# Patient Record
Sex: FEMALE
[De-identification: names, ages, dates, MRNs, and addresses within clinical notes are randomized per-mention and may not be internally consistent; named-entity substitution may affect disease eponyms.]

---

## 2018-09-21 ENCOUNTER — HOSPITAL ENCOUNTER (OUTPATIENT)
Dept: HOSPITAL 92 - L&D/OP | Age: 28
Discharge: HOME | End: 2018-09-21
Attending: OBSTETRICS & GYNECOLOGY
Payer: SELF-PAY

## 2018-09-21 VITALS — BODY MASS INDEX: 33.8 KG/M2

## 2018-09-21 DIAGNOSIS — Z79.84: ICD-10-CM

## 2018-09-21 DIAGNOSIS — Z3A.34: ICD-10-CM

## 2018-09-21 DIAGNOSIS — O24.415: ICD-10-CM

## 2018-09-21 DIAGNOSIS — Z79.899: ICD-10-CM

## 2018-09-21 DIAGNOSIS — O47.03: Primary | ICD-10-CM

## 2018-09-21 DIAGNOSIS — Z79.82: ICD-10-CM

## 2018-09-21 LAB
CRYSTAL-AUWI FLAG: 0 (ref 0–15)
HEV IGM SER QL: 2.3 (ref 0–7.99)
HYALINE CASTS #/AREA URNS LPF: (no result) LPF
PATHC CAST-AUWI FLAG: 0.43 (ref 0–2.49)
RBC UR QL AUTO: (no result) HPF (ref 0–3)
SP GR UR STRIP: 1 (ref 1–1.04)
SPERM-AUWI FLAG: 0 (ref 0–9.9)
WBC UR QL AUTO: (no result) HPF (ref 0–3)
YEAST-AUWI FLAG: 0 (ref 0–25)

## 2018-09-21 PROCEDURE — 87480 CANDIDA DNA DIR PROBE: CPT

## 2018-09-21 PROCEDURE — 87660 TRICHOMONAS VAGIN DIR PROBE: CPT

## 2018-09-21 PROCEDURE — 87491 CHLMYD TRACH DNA AMP PROBE: CPT

## 2018-09-21 PROCEDURE — 87591 N.GONORRHOEAE DNA AMP PROB: CPT

## 2018-09-21 PROCEDURE — 99285 EMERGENCY DEPT VISIT HI MDM: CPT

## 2018-09-21 PROCEDURE — 81003 URINALYSIS AUTO W/O SCOPE: CPT

## 2018-09-21 PROCEDURE — 87510 GARDNER VAG DNA DIR PROBE: CPT

## 2018-09-21 PROCEDURE — 81015 MICROSCOPIC EXAM OF URINE: CPT

## 2018-09-21 NOTE — PDOC.EVN
Event Note





- Event Note


Event Note: 


S: 27yo G1 at 34 EGA confirmed with 12 week US presented for contractions. Full 

H&P in physical chart. Contractions remain to be non-painful. Pt feels well 

with no complaints at this time.





O- /64, , RR 16, T 98.6


FHT: baseline 140's, mod variability, no deccels/accels. Cat 1.


Contractions: q4-5min


SVE: fingertip on second check





A/P:


- pt not in labor, UA and VP3 are pending


- will discharge and call pt with results


- follow up at [facility]








<Sonny Herring - Last Filed: 09/21/18 20:00>





Attending Addendum





- Attending Addendum


Date/Time: 09/21/18 2327





I personally evaluated the patient and discussed the management with Dr. Herring.


I agree with the History, Examination, Assessment and Plan documented above.  

Patient not feeling ctx and exam unchanged over 2 hours.  D/c home with 

precautions.








<Noemí Roberto - Last Filed: 09/21/18 22:53>

## 2018-10-27 ENCOUNTER — HOSPITAL ENCOUNTER (INPATIENT)
Dept: HOSPITAL 92 - L&D/OP | Age: 28
LOS: 3 days | Discharge: HOME | End: 2018-10-30
Attending: OBSTETRICS & GYNECOLOGY | Admitting: OBSTETRICS & GYNECOLOGY
Payer: MEDICAID

## 2018-10-27 VITALS — BODY MASS INDEX: 32.9 KG/M2

## 2018-10-27 DIAGNOSIS — Z3A.39: ICD-10-CM

## 2018-10-27 DIAGNOSIS — A63.0: ICD-10-CM

## 2018-10-27 DIAGNOSIS — D50.9: ICD-10-CM

## 2018-10-27 LAB
HBSAG INDEX: 0.21 S/CO (ref 0–0.99)
HGB BLD-MCNC: 12 G/DL (ref 12–16)
MCH RBC QN AUTO: 28.4 PG (ref 27–31)
MCV RBC AUTO: 84.2 FL (ref 78–98)
PLATELET # BLD AUTO: 298 THOU/UL (ref 130–400)
RBC # BLD AUTO: 4.23 MILL/UL (ref 4.2–5.4)
SYPHILIS ANTIBODY INDEX: 0.05 S/CO
WBC # BLD AUTO: 11.1 THOU/UL (ref 4.8–10.8)

## 2018-10-27 PROCEDURE — 86901 BLOOD TYPING SEROLOGIC RH(D): CPT

## 2018-10-27 PROCEDURE — 85049 AUTOMATED PLATELET COUNT: CPT

## 2018-10-27 PROCEDURE — 82805 BLOOD GASES W/O2 SATURATION: CPT

## 2018-10-27 PROCEDURE — 36415 COLL VENOUS BLD VENIPUNCTURE: CPT

## 2018-10-27 PROCEDURE — 85018 HEMOGLOBIN: CPT

## 2018-10-27 PROCEDURE — 99285 EMERGENCY DEPT VISIT HI MDM: CPT

## 2018-10-27 PROCEDURE — 85014 HEMATOCRIT: CPT

## 2018-10-27 PROCEDURE — 86850 RBC ANTIBODY SCREEN: CPT

## 2018-10-27 PROCEDURE — 85027 COMPLETE CBC AUTOMATED: CPT

## 2018-10-27 PROCEDURE — 87340 HEPATITIS B SURFACE AG IA: CPT

## 2018-10-27 PROCEDURE — 86780 TREPONEMA PALLIDUM: CPT

## 2018-10-27 PROCEDURE — 86900 BLOOD TYPING SEROLOGIC ABO: CPT

## 2018-10-27 PROCEDURE — 36416 COLLJ CAPILLARY BLOOD SPEC: CPT

## 2018-10-27 PROCEDURE — 51702 INSERT TEMP BLADDER CATH: CPT

## 2018-10-27 RX ADMIN — Medication SCH ML: at 14:50

## 2018-10-27 RX ADMIN — Medication SCH ML: at 20:46

## 2018-10-27 NOTE — PDOC.LDPN
Labor & Delivery Progress Note





- Subjective


Subjective: comfortable





- Objective


Vital signs reviewed and normal: yes


General: NAD, resting


Dilation: 9


Effacement: 100%


Station: 1+


FHT: category 1 (baseline 150)


[place] contractions every: 2-3


Resuscitative measures: maternal IV fluids


Plan: continue plan of care


-: 


29 yo  at 39.1wks by LMP





sIUP 


- SVE:progressing since last check, /+1, VS stable.


- IUPC in place with adequate contractions, transverse presentation. 


- U/S on 10/12 shows Hadlock of 83%


- Epidural in place


- Continue serial checks





GDMA2


- Accucheck q2h


- SSI


- Hypoglycemic protocol


- Last glucose 117





Iron Def Anemia


- Hg 12





Hx of Chlamydia in pregnancy


- Negative DARLING





High risk HPV


- Colpo on  showed 3 suspicious lesions 


- Continue outpatient workup 6 wk pp

## 2018-10-27 NOTE — PDOC.LDHP
Labor and Delivery H&P


HPI: 





29 yo G1 at 39 at 39.1 here for back pain. Started yesterday after clinic when 

she was seen for her prenatal care and had a cervical check. She rates it 7/10 

and it does not radiate. It is localized to b/l lower back and lower abdomen. 

Describes it as crampy. Has not taken any medications. She endorses minimal 

vaginal bleeding enough to soak through a pad after being checked in clinic 

yesterday. Endorses FM and denies LOF/VD. 


Current gestational age (weeks): 39 (39.1)


Due date: 18


Grav: 1


Para: 0


Current pregnancy complications: gestational diabetes


Abnormal US findings: No


Current medications: pre-nasreen vitamins, other (metformin 500mg BID)


Social history: none





- Physical Exam


General: breathing through contractions, other (discomfort from p ain)


Heart: RRR


Lungs: CTAB


Abdomen: gravid


FHT: category 1


[place] contractions every: 2-3 min





- Vaginal Exam


cm dilated: 4





- OB Labs


Blood type: O


RH: positive


Antibody Screen: negative


HIV: negative


RPR: negative


HEPSAg: negative


GBS: negative





- Assessment


L&D Assessment: term patient in labor





- Plan


-: 











29 yo G1 at 39.1 here with latent labor





1. sIUP, latent labor


-SVE: /-1


-FHT: Cat I, cameron q2-3min


-U/S on 10/12 shows Hadlock of 83%


-stadol due to pain from CTXs


-will admit for expectant management of labor








2. GDMA2


-accucheck 








3. High risk HPV


-continue outpatient workup





<Ivy Valentin - Last Filed: 10/27/18 07:56>





<Mina Anthony - Last Filed: 10/30/18 09:06>


Allergies/Adverse Reactions: 


 Allergies











Allergy/AdvReac Type Severity Reaction Status Date / Time


 


No Known Allergies Allergy   Verified 18 16:42














Attending Addendum





- Attending Addendum


Date/Time: 10/30/18 0906





I personally evaluated the patient and discussed the management with Dr. Valentin.


I agree with the History, Examination, Assessment and Plan documented above.








<Mina Anthony - Last Filed: 10/30/18 09:06>

## 2018-10-27 NOTE — PDOC.LDPN
Labor & Delivery Progress Note





- Subjective


Subjective: comfortable





- Objective


Vital signs reviewed and normal: yes


General: NAD, resting


Uterine fundus: non tender


Dilation: 7


Effacement: 100%


Station: 0


FHT: category 2 (130/mod/+ accels/occasional early and variable decels)


[place] contractions every: 2-3min


AROM: meconium stained fluid


Plan: continue plan of care


-: 


27 yo  at 39.1wks by LMP





sIUP 


- SVE: 7/100/0, checked by nurse who reported continued bloody fluid. Pt with 

no abdominal pain, VS stable. Will continue to monitor


- FHT: Cat II for occasional and variable decels.  cameron q2-3min


- U/S on 10/12 shows Hadlock of 83%


- Epidural in place








GDMA2


- Accucheck q2h


- SSI


- Hypoglycemic protocol


- Last glucose 115








Iron Def Anemia


- Hg 12








Hx of Chlamydia in pregnancy


- Negative DARLING








High risk HPV


- Colpo on  showed 3 suspicious lesions 


- Continue outpatient workup 6 wk pp








<Nory Rosario - Last Filed: 10/27/18 13:20>





Attending Addendum





- Attending Addendum


Date/Time: 18 6940





I personally evaluated the patient and discussed the management with Dr. Orellana


I agree with the History, Examination, Assessment and Plan documented above 

with any addition or exceptions noted below.








<Valentín Layton - Last Filed: 18 07:41>

## 2018-10-27 NOTE — PDOC.FM
- Objective


MAR Reviewed: Yes


Vital Signs & Weight: 


 Vital Signs (12 hours)











  Temp Pulse Resp BP


 


 10/27/18 06:01  97.5 F L  81  16  153/95 H








 Weight











Weight                         81.647 kg














Result Diagrams: 


 10/27/18 08:26








Dx/Plan


(1) Term pregnancy


Code(s): Z34.80 - ENCOUNTER FOR SUPRVSN OF NORMAL PREGNANCY, UNSP TRIMESTER   

Status: Acute   





- Plan


Plan: 


27 yo  at 39.1wks by LMP





sIUP 


- SVE: 8.5/100/0, Pt with intermittent abdominal pain mostly relieved by 

epidural, VS stable.


- FHT: Cat II for occasional and variable decels.  cameron q2-3min


- U/S on 10/12 shows Hadlock of 83%


- Epidural in place


- Continue serial checks








GDMA2


- Accucheck q2h


- SSI


- Hypoglycemic protocol


- Last glucose 93








Iron Def Anemia


- Hg 12








Hx of Chlamydia in pregnancy


- Negative DARLING








High risk HPV


- Colpo on  showed 3 suspicious lesions 


- Continue outpatient workup 6 wk pp

## 2018-10-27 NOTE — PDOC.LDPN
Labor & Delivery Progress Note





- Subjective


Subjective: comfortable





- Objective


Vital signs reviewed and normal: yes


General: NAD


Uterine fundus: non tender


Dilation: 8


Effacement: 100%


Station: 0


FHT: category 2 (140/mod/+accels/early and variable decels)


[place] contractions every: 2-3min





- Assessment


(1) Term pregnancy


Code(s): Z34.80 - ENCOUNTER FOR SUPRVSN OF NORMAL PREGNANCY, UNSP TRIMESTER   

Status: Acute   


Plan: continue plan of care


-: 


29 yo  at 39.1wks by LMP





sIUP 


- SVE: 8/100/0, checked by nurse who reported continued bloody fluid. Pt with 

brief abdominal pain that was relieved with epidural, VS stable. Will continue 

to monitor


- FHT: Cat II for occasional and variable decels.  cameron q2-3min


- U/S on 10/12 shows Hadlock of 83%


- Epidural in place


- Continue serial checks








GDMA2


- Accucheck q2h


- SSI


- Hypoglycemic protocol


- Last glucose 93








Iron Def Anemia


- Hg 12








Hx of Chlamydia in pregnancy


- Negative DARLING








High risk HPV


- Colpo on  showed 3 suspicious lesions 


- Continue outpatient workup 6 wk pp








<Nory Rosario - Last Filed: 10/27/18 15:14>





- Objective


Resuscitative measures: other





<Valentín Layton - Last Filed: 18 07:42>





Attending Addendum





- Attending Addendum


Date/Time: 1842





I personally evaluated the patient and discussed the management with Dr. Rosario


I agree with the History, Examination, Assessment and Plan documented above 

with any addition or exceptions noted below.








<Valentín Layton - Last Filed: 18 07:42>

## 2018-10-27 NOTE — PDOC.LDPN
Labor & Delivery Progress Note





- Subjective


Subjective: comfortable





- Objective


Vital signs reviewed and normal: yes


General: NAD


Uterine fundus: non tender


Dilation: 8.5


Effacement: 100%


Station: 0


FHT: category 2 (150/moderate/+ accels/occasional early decels)


[place] contractions every: 2-3min


AROM: meconium stained fluid





- Assessment


(1) Term pregnancy


Code(s): Z34.80 - ENCOUNTER FOR SUPRVSN OF NORMAL PREGNANCY, UNSP TRIMESTER   

Current Visit: Yes   Status: Acute   


-: 


29 yo  at 39.1wks by LMP





sIUP 


- SVE: unchanged, 8.5/100/0, abdominal pain resolved, VS stable.


- IUPC in place with adequate contractions, transverse presentation. 


- Will start Pitocin


- FHT: Cat II for occasional early decels.  cameron q2-3min


- U/S on 10/12 shows Hadlock of 83%


- Epidural in place


- Continue serial checks








GDMA2


- Accucheck q2h


- SSI


- Hypoglycemic protocol


- Last glucose 108








Iron Def Anemia


- Hg 12








Hx of Chlamydia in pregnancy


- Negative DARLING








High risk HPV


- Colpo on  showed 3 suspicious lesions 


- Continue outpatient workup 6 wk pp

## 2018-10-27 NOTE — PDOC.LDPN
Labor & Delivery Progress Note





- Subjective


Subjective: painful contractions





- Objective


Vital signs reviewed and normal: yes


General: NAD


Dilation: 8.5


Effacement: 100%


Station: 0


FHT: category 2 (140/mod/+ accels/early and variable decels)


[place] contractions every: 2-3min


AROM: meconium stained fluid





- Assessment


(1) Term pregnancy


Code(s): Z34.80 - ENCOUNTER FOR SUPRVSN OF NORMAL PREGNANCY, UNSP TRIMESTER   

Current Visit: Yes   Status: Acute   


-: 


27 yo  at 39.1wks by LMP





sIUP 


- SVE: 8.5/100/0, Pt with intermittent abdominal pain mostly relieved by 

epidural, VS stable.


- Will insert IUPC and consider starting pit


- FHT: Cat II for occasional and variable decels.  cameron q2-3min


- U/S on 10/12 shows Hadlock of 83%


- Epidural in place


- Continue serial checks








GDMA2


- Accucheck q2h


- SSI


- Hypoglycemic protocol


- Last glucose 93








Iron Def Anemia


- Hg 12








Hx of Chlamydia in pregnancy


- Negative DARLING








High risk HPV


- Colpo on  showed 3 suspicious lesions 


- Continue outpatient workup 6 wk pp

## 2018-10-27 NOTE — PDOC.LDPN
Labor & Delivery Progress Note





- Subjective


Subjective: comfortable





- Objective


Vital signs reviewed and normal: yes


General: NAD


Uterine fundus: non tender


Dilation: 8


Effacement: 100%


Station: 0


FHT: category 2 (150/mod/no accels/1 variable decel)


[place] contractions every: 2-3min


AROM: meconium stained fluid


IUPC placed: yes





- Assessment


(1) Term pregnancy


Code(s): Z34.80 - ENCOUNTER FOR SUPRVSN OF NORMAL PREGNANCY, UNSP TRIMESTER   

Current Visit: Yes   Status: Acute   


-: 


27 yo  at 39.1wks by LMP





sIUP 


- SVE: unchanged, 8100/0, VS stable.


- IUPC in place with adequate contractions, transverse presentation. 


- Pitocin has not yet been started, contractions no longer adequate


- FHT: Cat II for rare variable decel.  cameron q2-3min


- U/S on 10/12 shows Hadlock of 83%


- Epidural in place


- Continue serial checks








GDMA2


- Accucheck q2h


- SSI


- Hypoglycemic protocol


- Last glucose 108








Iron Def Anemia


- Hg 12








Hx of Chlamydia in pregnancy


- Negative DARLING








High risk HPV


- Colpo on  showed 3 suspicious lesions 


- Continue outpatient workup 6 wk pp

## 2018-10-28 LAB
ANALYZER IN CARDIO: (no result)
BASE EXCESS STD BLDA CALC-SCNC: -6.9 MEQ/L
BASE EXCESS STD BLDV CALC-SCNC: -4.6 MEQ/L
HCO3 BLDA-SCNC: 22.2 MEQ/L (ref 22–28)
HCO3 BLDV-SCNC: 21 MEQ/L (ref 22–28)
HGB BLD-MCNC: 10.2 G/DL (ref 12–16)
PH BLDV: 7.33 [PH] (ref 7.32–7.43)
PLATELET # BLD AUTO: 237 THOU/UL (ref 130–400)

## 2018-10-28 PROCEDURE — 4A1HXFZ MONITORING OF PRODUCTS OF CONCEPTION, CARDIAC RHYTHM, EXTERNAL APPROACH: ICD-10-PCS | Performed by: OBSTETRICS & GYNECOLOGY

## 2018-10-28 PROCEDURE — 4A1HXCZ MONITORING OF PRODUCTS OF CONCEPTION, CARDIAC RATE, EXTERNAL APPROACH: ICD-10-PCS | Performed by: OBSTETRICS & GYNECOLOGY

## 2018-10-28 RX ADMIN — Medication SCH MLS: at 02:28

## 2018-10-28 RX ADMIN — Medication SCH MLS: at 00:45

## 2018-10-28 RX ADMIN — DOCUSATE CALCIUM SCH MG: 240 CAPSULE, LIQUID FILLED ORAL at 22:51

## 2018-10-28 RX ADMIN — DOCUSATE CALCIUM SCH MG: 240 CAPSULE, LIQUID FILLED ORAL at 09:45

## 2018-10-28 NOTE — DN-2
DATE OF DELIVERY:  10/28/2018

 

DELIVERING PHYSICIANS:

1.  Sonny Herring M.D., PGY-1

2.  Pratik Clinton M.D., PGY-2

3.  _____, PGY-3.

 

ATTENDING:  Valentín Layton M.D.

 

PROCEDURE:  Spontaneous vaginal delivery.

 

ANESTHESIA:  Epidural.

 

ESTIMATED BLOOD LOSS:  400 mL.

 

PREOPERATIVE DIAGNOSES:

1.  Term intrauterine pregnancy in labor.

2.  Gestational diabetes A2.

3.  High risk human papillomavirus.

 

POSTOPERATIVE DIAGNOSES:

1.  Term intrauterine pregnancy, delivered.

2.  Gestational diabetes A2.

3.  High risk human papillomavirus.

 

INDICATIONS:  A 28-year-old female, G1, P0-0-0-0 at 39 and 1 weeks presented in 
active labor.

 

DELIVERY NOTE:  This is a 28-year-old female, G1, P0-0-0-0 at 39 and 2 weeks 
who delivered a viable female infant at 0034 hours.  Following an uneventful 
antepartum course, a vigorous female was delivered over an intact perineum in 
the occiput anterior position, anterior shoulder and then remainder of the body 
delivered.  Nuchal cord x1, body cord x1.  The head was held down and mouth and 
nares were bulb suctioned.  Cord clamped and cut and cord blood collected.  
Placenta delivered intact with a 3-vessel cord noted.  Fundal massage was 
performed and the fundus was firm.  Cervix and vagina were inspected and a few 
first degree lacerations were noted on the perineal wall, but did not require 
repair at this time.  Infant went to the  nursery in good condition for 
routine care.  Apgars were 8 and 9 at 1 and 5 minutes respectively.  The 
patient tolerated delivery well and went to postpartum after routine recovery 
care.



I was present and supervising the entire second and third stages of delivery.

 

MARY

## 2018-10-28 NOTE — PDOC.PP
Post Partum Progress Note


Post Partum Day #: 0


Subjective: 





Feeling well this morning. Having some cramping but it is improved with Motrin. 

She is feeling a little woozy when standing. Reports lochia is approx the 

amount of a menstrual cycle. Continuing to attempt breastfeeding but is 

concerned baby is not latching well.


PO intake tolerated: yes


Flatus: yes


Ambulation: yes


 Vital Signs (12 hours)











  Temp Pulse Resp BP


 


 10/28/18 06:00  98.5 F  86  18  134/65


 


 10/28/18 03:50  98.4 F  88  18  132/76








 Weight











Weight                         81.647 kg

















- Physical Examination


General: NAD


Cardiovascular: no m/r/g, RRR


Respiratory: clear to auscultation bilaterally


Abdominal: lochia (scant), appropriately TTP


Fundus firm & at: umbilicus


Neurological: no gross focal deficits


Psychiatric: A&Ox3, normal affect


Result Diagrams: 


 10/27/18 08:26





Additional Labs: 


 Post Partum Labs











Blood Type  O POSITIVE   10/27/18  08:26    


 


Hep Bs Antigen  Non-Reactive S/CO (NonReactive)   10/27/18  08:26    











(1) Gestational diabetes


Code(s): O24.419 - GESTATIONAL DIABETES MELLITUS IN PREGNANCY, UNSP CONTROL   

Status: Acute   





- Assessment/Plan





27 yo G1 now  who delivered earlier this morning via , PPD#0





1. PPD #0


- Ambulating and pain adequately controlled


- Baby on bili lights this morning for Poli positive


- Encouraged ambulation


- Lightheaded, will check H&H this morning


- Continue PNV


- Motrin and Tylenol #3 PRN pain





2. A2GDM


- Needs 6 week GTT

## 2018-10-29 VITALS — TEMPERATURE: 97.6 F

## 2018-10-29 RX ADMIN — DOCUSATE CALCIUM SCH MG: 240 CAPSULE, LIQUID FILLED ORAL at 09:46

## 2018-10-29 RX ADMIN — DOCUSATE CALCIUM SCH MG: 240 CAPSULE, LIQUID FILLED ORAL at 20:50

## 2018-10-29 NOTE — PDOC.PP
Post Partum Progress Note


Post Partum Day #: 1


Subjective: 





Pt reports doing well. Reports being sore in back. Has been getting up and 

moving around. Tolerating diet well. Pt concerned with breastfeeding at this 

time. Says infant not latching well. Reports lochia about same as period. Pt 

denies any lightheadness or dizziness.


PO intake tolerated: yes


Flatus: yes


Ambulation: yes


 Vital Signs (12 hours)











  Temp Pulse Resp BP


 


 10/28/18 20:00  98.3 F  75  18  100/60








 Weight











Weight                         81.647 kg

















- Physical Examination


General: NAD


Cardiovascular: no m/r/g, RRR


Respiratory: clear to auscultation bilaterally, non-labored breathing


Abdominal: + bowel sounds, lochia (Same as period), no distention, 

appropriately TTP


Extremities: negative homans (B)


Neurological: no gross focal deficits


Psychiatric: A&Ox3, normal affect


Result Diagrams: 


 10/28/18 08:16





Additional Labs: 


 Post Partum Labs











Blood Type  O POSITIVE   10/27/18  08:26    


 


Hep Bs Antigen  Non-Reactive S/CO (NonReactive)   10/27/18  08:26    











(1) Gestational diabetes


Code(s): O24.419 - GESTATIONAL DIABETES MELLITUS IN PREGNANCY, UNS CONTROL   

Status: Acute   


Qualifiers: 


   Gestational diabetes mellitus control: oral hypoglycemic-controlled 





(2) Term pregnancy


Code(s): Z34.80 - ENCOUNTER FOR SUPRVSN OF NORMAL PREGNANCY, [facility] TRIMESTER   

Status: Acute   





- Assessment/Plan





27 yo G1 now  who delivered on 10/28/18 via  at 0:34, PPD#1





1. PPD #1


- Reports having some back pain. Reports pain being controlled. Discussed with 

her likely due to length of labor and some muscle soreness due to pushing. 

Advised to continue to ask for pain medicine as needed


- Baby on bili lights this morning for Poli positive. Likely will be here til 

tmrw


- Encouraged ambulation


- Continue PNV


- Motrin and tylenol prn as needed. 





2. A2GDM


- Needs 6 week GTT.


-BG has been elevated. Last check normal. Continue to monitor today. 





<Pratik Clinton - Last Filed: 10/29/18 07:53>


 Weight











Weight                         180 lb














Result Diagrams: 


 10/28/18 08:16





Additional Labs: 


 Post Partum Labs











Blood Type  O POSITIVE   10/27/18  08:26    


 


Hep Bs Antigen  Non-Reactive S/CO (NonReactive)   10/27/18  08:26    














<Valentín Layton - Last Filed: 18 07:50>





Attending Addendum





- Attending Addendum


Date/Time: 18 0750





I personally evaluated the patient and discussed the management with Dr. Clinton


I agree with the History, Examination, Assessment and Plan documented above 

with any addition or exceptions noted below.








<Valentín Layton - Last Filed: 18 07:50>

## 2018-10-30 VITALS — SYSTOLIC BLOOD PRESSURE: 113 MMHG | DIASTOLIC BLOOD PRESSURE: 71 MMHG

## 2018-10-30 RX ADMIN — DOCUSATE CALCIUM SCH MG: 240 CAPSULE, LIQUID FILLED ORAL at 10:01

## 2018-10-30 NOTE — PDOC.PP
Post Partum Progress Note


Post Partum Day #: 2


Subjective: 





Pt doing well. Reports some occasional leg pain but reports being well 

controlled with tylenol and motrin. Pt up and moving. No lightheadness or 

dizziness. Tolerating PO. No other concerns at this time. 


PO intake tolerated: yes


Flatus: yes


Ambulation: yes


 Weight











Weight                         81.647 kg

















- Physical Examination


General: NAD


Cardiovascular: no m/r/g, RRR


Respiratory: clear to auscultation bilaterally, non-labored breathing


Abdominal: + bowel sounds, lochia (Lochia same as period. No concerns), no 

distention, appropriately TTP


Fundus firm & at: below umbilicus


Neurological: no gross focal deficits


Psychiatric: A&Ox3, normal affect


Result Diagrams: 


 10/28/18 08:16





Additional Labs: 


 Post Partum Labs











Blood Type  O POSITIVE   10/27/18  08:26    


 


Hep Bs Antigen  Non-Reactive S/CO (NonReactive)   10/27/18  08:26    











(1) Gestational diabetes


Code(s): O24.419 - GESTATIONAL DIABETES MELLITUS IN PREGNANCY, UNSP CONTROL   

Status: Acute   


Qualifiers: 


   Gestational diabetes mellitus control: oral hypoglycemic-controlled 





(2) Term pregnancy


Code(s): Z34.80 - ENCOUNTER FOR SUPRVSN OF NORMAL PREGNANCY, UNSP TRIMESTER   

Status: Acute   





- Assessment/Plan





27 yo G1 now  who delivered on 10/28/18 via  at 0:34, PPD#2





1. PPD #2


-  Reports pain being controlled. Discussed with her likely due to length of 

labor and some muscle soreness due to pushing. Advised to continue to ask for 

pain medicine as needed


- Baby on bili lights this morning for Poli positive. Awaiting bili check. 

Pending possibly could be discharge today. 


- Encouraged ambulation


- Continue PNV


- Motrin and tylenol prn as needed. 





2. A2GDM


- Needs 6 week GTT.








<Pratik Clinton - Last Filed: 10/30/18 07:57>


 Vital Signs (12 hours)











  Temp Pulse Resp BP Pulse Ox


 


 10/30/18 08:13  97.6 F  64  20  113/71  98








 Weight











Weight                         81.647 kg














Result Diagrams: 


 10/28/18 08:16





Additional Labs: 


 Post Partum Labs











Blood Type  O POSITIVE   10/27/18  08:26    


 


Hep Bs Antigen  Non-Reactive S/CO (NonReactive)   10/27/18  08:26    














<Mina Anthony - Last Filed: 10/30/18 09:12>





Attending Addendum





- Attending Addendum


Date/Time: 10/30/18 0912





I evaluated the patient and discussed the management with Dr. Clinton.


I agree with the Assessment and Plan documented above.








<Mina Anthony - Last Filed: 10/30/18 09:12>

## 2019-03-22 ENCOUNTER — HOSPITAL ENCOUNTER (EMERGENCY)
Dept: HOSPITAL 92 - ERS | Age: 29
LOS: 1 days | Discharge: HOME | End: 2019-03-23
Payer: MEDICAID

## 2019-03-22 DIAGNOSIS — R79.89: ICD-10-CM

## 2019-03-22 DIAGNOSIS — R73.9: ICD-10-CM

## 2019-03-22 DIAGNOSIS — O99.89: Primary | ICD-10-CM

## 2019-03-22 DIAGNOSIS — H53.8: ICD-10-CM

## 2019-03-22 LAB
ALBUMIN SERPL BCG-MCNC: 4.4 G/DL (ref 3.5–5)
ALP SERPL-CCNC: 216 U/L (ref 40–150)
ALT SERPL W P-5'-P-CCNC: 142 U/L (ref 8–55)
ANION GAP SERPL CALC-SCNC: 16 MMOL/L (ref 10–20)
AST SERPL-CCNC: 126 U/L (ref 5–34)
BASOPHILS # BLD AUTO: 0 THOU/UL (ref 0–0.2)
BASOPHILS NFR BLD AUTO: 0.5 % (ref 0–1)
BILIRUB SERPL-MCNC: 0.3 MG/DL (ref 0.2–1.2)
BUN SERPL-MCNC: 11 MG/DL (ref 7–18.7)
CALCIUM SERPL-MCNC: 9.7 MG/DL (ref 7.8–10.44)
CHLORIDE SERPL-SCNC: 98 MMOL/L (ref 98–107)
CO2 SERPL-SCNC: 22 MMOL/L (ref 22–29)
CREAT CL PREDICTED SERPL C-G-VRATE: 0 ML/MIN (ref 70–130)
CRYSTAL-AUWI FLAG: 0 (ref 0–15)
EOSINOPHIL # BLD AUTO: 0.2 THOU/UL (ref 0–0.7)
EOSINOPHIL NFR BLD AUTO: 2.1 % (ref 0–10)
GLOBULIN SER CALC-MCNC: 4 G/DL (ref 2.4–3.5)
GLUCOSE SERPL-MCNC: 411 MG/DL (ref 70–105)
GLUCOSE UR STRIP-MCNC: >=1000 MG/DL
HEV IGM SER QL: 0.8 (ref 0–7.99)
HGB BLD-MCNC: 12.9 G/DL (ref 12–16)
HYALINE CASTS #/AREA URNS LPF: (no result) LPF
LYMPHOCYTES # BLD: 2.7 THOU/UL (ref 1.2–3.4)
LYMPHOCYTES NFR BLD AUTO: 32 % (ref 21–51)
MCH RBC QN AUTO: 28.2 PG (ref 27–31)
MCV RBC AUTO: 81.2 FL (ref 78–98)
MONOCYTES # BLD AUTO: 0.3 THOU/UL (ref 0.11–0.59)
MONOCYTES NFR BLD AUTO: 3.7 % (ref 0–10)
NEUTROPHILS # BLD AUTO: 5.2 THOU/UL (ref 1.4–6.5)
NEUTROPHILS NFR BLD AUTO: 61.6 % (ref 42–75)
PATHC CAST-AUWI FLAG: 0 (ref 0–2.49)
PLATELET # BLD AUTO: 271 THOU/UL (ref 130–400)
POTASSIUM SERPL-SCNC: 3.9 MMOL/L (ref 3.5–5.1)
RBC # BLD AUTO: 4.59 MILL/UL (ref 4.2–5.4)
RBC UR QL AUTO: (no result) HPF (ref 0–3)
SODIUM SERPL-SCNC: 132 MMOL/L (ref 136–145)
SP GR UR STRIP: 1.04 (ref 1–1.04)
SPERM-AUWI FLAG: 0 (ref 0–9.9)
WBC # BLD AUTO: 8.4 THOU/UL (ref 4.8–10.8)
WBC UR QL AUTO: (no result) HPF (ref 0–3)
YEAST-AUWI FLAG: 0 (ref 0–25)

## 2019-03-22 PROCEDURE — 81003 URINALYSIS AUTO W/O SCOPE: CPT

## 2019-03-22 PROCEDURE — 85025 COMPLETE CBC W/AUTO DIFF WBC: CPT

## 2019-03-22 PROCEDURE — 96360 HYDRATION IV INFUSION INIT: CPT

## 2019-03-22 PROCEDURE — 81015 MICROSCOPIC EXAM OF URINE: CPT

## 2019-03-22 PROCEDURE — 80053 COMPREHEN METABOLIC PANEL: CPT

## 2019-03-22 PROCEDURE — 70450 CT HEAD/BRAIN W/O DYE: CPT

## 2019-03-22 PROCEDURE — 36415 COLL VENOUS BLD VENIPUNCTURE: CPT

## 2019-03-22 NOTE — CT
HEAD CT WITHOUT CONTRAST

3/22/19

 

HISTORY: 

Vision changes. Headache. Dizziness. 

 

COMPARISON:  

None.

 

FINDINGS:  

No parenchymal hemorrhage. No extra-axial hematoma. No midline shift. Basilar cisterns are patent. Br
ain volume, age appropriate. Cortical gray-white matter differentiation is preserved. 

 

Ventricles and sulci are patent and symmetric. 

 

Calvarium is intact. Adequate aeration of the sinuses and mastoid air cells. 

 

IMPRESSION:  

No acute intracranial process.

 

POS: [facility]

## 2019-06-24 ENCOUNTER — HOSPITAL ENCOUNTER (EMERGENCY)
Dept: HOSPITAL 92 - ERS | Age: 29
Discharge: HOME | End: 2019-06-24
Payer: SELF-PAY

## 2019-06-24 DIAGNOSIS — L02.412: Primary | ICD-10-CM

## 2019-06-24 PROCEDURE — 10060 I&D ABSCESS SIMPLE/SINGLE: CPT

## 2022-08-22 ENCOUNTER — HOSPITAL ENCOUNTER (EMERGENCY)
Dept: HOSPITAL 5 - ED | Age: 32
LOS: 1 days | Discharge: HOME | End: 2022-08-23
Payer: SELF-PAY

## 2022-08-22 DIAGNOSIS — B96.89: ICD-10-CM

## 2022-08-22 DIAGNOSIS — N76.0: Primary | ICD-10-CM

## 2022-08-22 PROCEDURE — 99283 EMERGENCY DEPT VISIT LOW MDM: CPT

## 2022-08-22 PROCEDURE — 87210 SMEAR WET MOUNT SALINE/INK: CPT

## 2022-08-22 PROCEDURE — 81001 URINALYSIS AUTO W/SCOPE: CPT

## 2022-08-22 PROCEDURE — 87591 N.GONORRHOEAE DNA AMP PROB: CPT

## 2022-08-23 VITALS — DIASTOLIC BLOOD PRESSURE: 81 MMHG | SYSTOLIC BLOOD PRESSURE: 127 MMHG

## 2022-08-23 LAB
BACTERIA #/AREA URNS HPF: (no result) /HPF
RBC #/AREA URNS HPF: 1 /HPF (ref 0–6)
WBC #/AREA URNS HPF: 1 /HPF (ref 0–6)

## 2022-08-23 NOTE — EMERGENCY DEPARTMENT REPORT
ED General Adult HPI





- General


Chief complaint: Urogenital-Female


Stated complaint: DIABETES


Time Seen by Provider: 08/23/22 09:13


Source: patient


Mode of arrival: Ambulatory


Limitations: Language Barrier





- History of Present Illness


Initial comments: 





32-year-old female past medical history of diabetes reports to the ER with 

complaints of dysuria for 2 weeks, burning and irritation to the vaginal area.  

Patient denies vaginal bleeding, no vaginal discharge.  Patient denies any 

concerns for STD or pregnancy.  Patient denies abdominal pain.  Patient reports 

no other acute symptoms at this time.





- Related Data


                                  Previous Rx's











 Medication  Instructions  Recorded  Last Taken  Type


 


metroNIDAZOLE [Flagyl] 500 mg PO Q12HR 7 Days #14 tab 08/23/22 Unknown Rx











                                    Allergies











Allergy/AdvReac Type Severity Reaction Status Date / Time


 


No Known Allergies Allergy   Verified 08/23/22 01:59














ED Review of Systems


ROS: 


Stated complaint: DIABETES


Other details as noted in HPI





Comment: All other systems reviewed and negative


Genitourinary: dysuria, other (Vaginal itching with rash.)





ED Past Medical Hx





- Medications


Home Medications: 


                                Home Medications











 Medication  Instructions  Recorded  Confirmed  Last Taken  Type


 


metroNIDAZOLE [Flagyl] 500 mg PO Q12HR 7 Days #14 tab 08/23/22  Unknown Rx














ED Physical Exam





- General


Limitations: Language Barrier


General appearance: alert, in no apparent distress





- Head


Head exam: Present: atraumatic, normocephalic





- Eye


Eye exam: Present: normal appearance





- ENT


ENT exam: Present: mucous membranes moist





- Neck


Neck exam: Present: normal inspection





- Respiratory


Respiratory exam: Present: normal lung sounds bilaterally.  Absent: respiratory 

distress





- Cardiovascular


Cardiovascular Exam: Present: regular rate, normal rhythm.  Absent: systolic 

murmur, diastolic murmur, rubs, gallop





- GI/Abdominal


GI/Abdominal exam: Present: soft, normal bowel sounds





- Extremities Exam


Extremities exam: Present: normal inspection





- Back Exam


Back exam: Present: normal inspection





- Neurological Exam


Neurological exam: Present: alert, oriented X3





- Psychiatric


Psychiatric exam: Present: normal affect, normal mood





- Skin


Skin exam: Present: warm, dry, intact, normal color.  Absent: rash





ED Course


                                   Vital Signs











  08/23/22 08/23/22





  01:57 08:06


 


Temperature 98.3 F 97.7 F


 


Pulse Rate 79 71


 


Respiratory 18 16





Rate  


 


Blood Pressure 141/86 


 


Blood Pressure  127/81





[Left]  


 


O2 Sat by Pulse 99 100





Oximetry  











Critical care attestation.: 


If time is entered above; I have spent that time in minutes in the direct care 

of this critically ill patient, excluding procedure time.








ED Disposition


Clinical Impression: 


 Bacterial vaginosis





Disposition: 01 HOME / SELF CARE / HOMELESS


Is pt being admited?: No


Condition: Stable


Instructions:  Bacterial Vaginosis (ED), Bacterial Vaginosis, Easy-to-Read, 

Vaginitis


Prescriptions: 


metroNIDAZOLE [Flagyl] 500 mg PO Q12HR 7 Days #14 tab


Forms:  [facility] Treatment and Prevention


Print Language: [place]